# Patient Record
Sex: MALE | Race: OTHER | HISPANIC OR LATINO | URBAN - METROPOLITAN AREA
[De-identification: names, ages, dates, MRNs, and addresses within clinical notes are randomized per-mention and may not be internally consistent; named-entity substitution may affect disease eponyms.]

---

## 2017-02-08 ENCOUNTER — EMERGENCY (EMERGENCY)
Facility: HOSPITAL | Age: 22
LOS: 1 days | Discharge: HOME | End: 2017-02-08
Attending: EMERGENCY MEDICINE
Payer: MEDICAID

## 2017-02-08 VITALS
SYSTOLIC BLOOD PRESSURE: 119 MMHG | WEIGHT: 160.06 LBS | OXYGEN SATURATION: 99 % | HEIGHT: 71 IN | DIASTOLIC BLOOD PRESSURE: 79 MMHG | HEART RATE: 59 BPM | RESPIRATION RATE: 18 BRPM | TEMPERATURE: 98 F
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
## 2017-02-08 DIAGNOSIS — M25.511 PAIN IN RIGHT SHOULDER: ICD-10-CM

## 2017-02-08 PROCEDURE — 99283 EMERGENCY DEPT VISIT LOW MDM: CPT | Mod: 25

## 2017-02-08 PROCEDURE — 73030 X-RAY EXAM OF SHOULDER: CPT | Mod: 26,RT

## 2017-02-08 PROCEDURE — 73030 X-RAY EXAM OF SHOULDER: CPT

## 2017-02-08 RX ORDER — KETOROLAC TROMETHAMINE 30 MG/ML
60 SYRINGE (ML) INJECTION ONCE
Qty: 0 | Refills: 0 | Status: DISCONTINUED | OUTPATIENT
Start: 2017-02-08 | End: 2017-02-08

## 2017-02-08 RX ORDER — IBUPROFEN 200 MG
1 TABLET ORAL
Qty: 16 | Refills: 0 | OUTPATIENT
Start: 2017-02-08 | End: 2017-02-12

## 2017-02-08 RX ORDER — IBUPROFEN 200 MG
600 TABLET ORAL ONCE
Qty: 0 | Refills: 0 | Status: COMPLETED | OUTPATIENT
Start: 2017-02-08 | End: 2017-02-08

## 2017-02-08 RX ADMIN — Medication 600 MILLIGRAM(S): at 02:09

## 2017-02-08 RX ADMIN — Medication 600 MILLIGRAM(S): at 02:10

## 2017-02-08 NOTE — ED PROVIDER NOTE - ATTENDING CONTRIBUTION TO CARE
21y old with fall during a game, complaints of shoulder pain, distal to injury sensory, motor and capillary intact, no obvious swelling, no erythema, joint intact, plan to do xray, out patient pt follow up

## 2017-02-08 NOTE — ED PROVIDER NOTE - LOCATION
shoulder ttp right anterior shoulder, no deformities, no skin changes, no swelling, FROM/strength/sensation to right shoulder, able to cross arm over body, negative drop arm test/shoulder

## 2017-02-08 NOTE — ED PROVIDER NOTE - OBJECTIVE STATEMENT
This is a 20 y/o male presenting to the ED with right shoulder pain after falling during soccer and hitting his shoulder on the field yesterday. Pt admits to pain, especially with ROM. He has not taken any medications for pain. Pt denies loss of rom, head injury, loc, nausea, vomiting, paresthesias, weakness, sob, cp, hp.